# Patient Record
Sex: FEMALE
[De-identification: names, ages, dates, MRNs, and addresses within clinical notes are randomized per-mention and may not be internally consistent; named-entity substitution may affect disease eponyms.]

---

## 2022-11-19 ENCOUNTER — NURSE TRIAGE (OUTPATIENT)
Dept: OTHER | Facility: CLINIC | Age: 27
End: 2022-11-19

## 2022-11-19 NOTE — TELEPHONE ENCOUNTER
Location of patient: [address] [facility]  call from [employer] at [facility] Name: Arsh Herman MRN: [id number]    Subjective: Caller states \"I had a fever yesterday, and now I have boils on both hands and feet and inside my mouth. It feels like there is some swelling around my teeth. \"     Onset:  Friday, 11/18 in the am    Due Date: 6/6/2022    Weeks of gestation: 11 weeks    Fetal Movement: not felt yet    Triage indicates for patient to Go to ED Now    Care advice provided, patient verbalizes understanding; denies any other questions or concerns; instructed to call back for any new or worsening symptoms. Pt is new to [facility]. and does not know where there are [facility] that she can get to. Pt is aware of [place] [facility] and Triage RN asked her to go there.     Reason for Disposition   [1] Cause unknown AND [2] new blisters are developing    Protocols used: Blister - Foot and Hand-ADULT-